# Patient Record
Sex: FEMALE | Race: OTHER | NOT HISPANIC OR LATINO | URBAN - METROPOLITAN AREA
[De-identification: names, ages, dates, MRNs, and addresses within clinical notes are randomized per-mention and may not be internally consistent; named-entity substitution may affect disease eponyms.]

---

## 2017-09-27 ENCOUNTER — EMERGENCY (EMERGENCY)
Facility: HOSPITAL | Age: 47
LOS: 1 days | Discharge: HOME | End: 2017-09-27
Attending: EMERGENCY MEDICINE
Payer: COMMERCIAL

## 2017-09-27 VITALS
HEIGHT: 66 IN | DIASTOLIC BLOOD PRESSURE: 100 MMHG | RESPIRATION RATE: 18 BRPM | OXYGEN SATURATION: 99 % | WEIGHT: 164.91 LBS | SYSTOLIC BLOOD PRESSURE: 153 MMHG | HEART RATE: 68 BPM | TEMPERATURE: 98 F

## 2017-09-27 LAB
ALBUMIN SERPL ELPH-MCNC: 3.7 G/DL — SIGNIFICANT CHANGE UP (ref 3.5–5)
ALP SERPL-CCNC: 46 U/L — SIGNIFICANT CHANGE UP (ref 40–120)
ALT FLD-CCNC: 41 U/L DA — SIGNIFICANT CHANGE UP (ref 10–60)
ANION GAP SERPL CALC-SCNC: 8 MMOL/L — SIGNIFICANT CHANGE UP (ref 5–17)
AST SERPL-CCNC: 21 U/L — SIGNIFICANT CHANGE UP (ref 10–40)
BASOPHILS # BLD AUTO: 0.1 K/UL — SIGNIFICANT CHANGE UP (ref 0–0.2)
BASOPHILS NFR BLD AUTO: 1.6 % — SIGNIFICANT CHANGE UP (ref 0–2)
BILIRUB SERPL-MCNC: 0.5 MG/DL — SIGNIFICANT CHANGE UP (ref 0.2–1.2)
BUN SERPL-MCNC: 16 MG/DL — SIGNIFICANT CHANGE UP (ref 7–18)
CALCIUM SERPL-MCNC: 8.7 MG/DL — SIGNIFICANT CHANGE UP (ref 8.4–10.5)
CHLORIDE SERPL-SCNC: 107 MMOL/L — SIGNIFICANT CHANGE UP (ref 96–108)
CO2 SERPL-SCNC: 27 MMOL/L — SIGNIFICANT CHANGE UP (ref 22–31)
CREAT SERPL-MCNC: 0.85 MG/DL — SIGNIFICANT CHANGE UP (ref 0.5–1.3)
EOSINOPHIL # BLD AUTO: 0.2 K/UL — SIGNIFICANT CHANGE UP (ref 0–0.5)
EOSINOPHIL NFR BLD AUTO: 2.6 % — SIGNIFICANT CHANGE UP (ref 0–6)
GLUCOSE SERPL-MCNC: 83 MG/DL — SIGNIFICANT CHANGE UP (ref 70–99)
HCG SERPL-ACNC: <1 MIU/ML — SIGNIFICANT CHANGE UP
HCT VFR BLD CALC: 39.3 % — SIGNIFICANT CHANGE UP (ref 34.5–45)
HGB BLD-MCNC: 13.7 G/DL — SIGNIFICANT CHANGE UP (ref 11.5–15.5)
LIDOCAIN IGE QN: 190 U/L — SIGNIFICANT CHANGE UP (ref 73–393)
LYMPHOCYTES # BLD AUTO: 2.6 K/UL — SIGNIFICANT CHANGE UP (ref 1–3.3)
LYMPHOCYTES # BLD AUTO: 36.1 % — SIGNIFICANT CHANGE UP (ref 13–44)
MCHC RBC-ENTMCNC: 28.9 PG — SIGNIFICANT CHANGE UP (ref 27–34)
MCHC RBC-ENTMCNC: 34.8 GM/DL — SIGNIFICANT CHANGE UP (ref 32–36)
MCV RBC AUTO: 83.1 FL — SIGNIFICANT CHANGE UP (ref 80–100)
MONOCYTES # BLD AUTO: 0.5 K/UL — SIGNIFICANT CHANGE UP (ref 0–0.9)
MONOCYTES NFR BLD AUTO: 6.5 % — SIGNIFICANT CHANGE UP (ref 2–14)
NEUTROPHILS # BLD AUTO: 3.8 K/UL — SIGNIFICANT CHANGE UP (ref 1.8–7.4)
NEUTROPHILS NFR BLD AUTO: 53.1 % — SIGNIFICANT CHANGE UP (ref 43–77)
PLATELET # BLD AUTO: 227 K/UL — SIGNIFICANT CHANGE UP (ref 150–400)
POTASSIUM SERPL-MCNC: 3.5 MMOL/L — SIGNIFICANT CHANGE UP (ref 3.5–5.3)
POTASSIUM SERPL-SCNC: 3.5 MMOL/L — SIGNIFICANT CHANGE UP (ref 3.5–5.3)
PROT SERPL-MCNC: 7 G/DL — SIGNIFICANT CHANGE UP (ref 6–8.3)
RBC # BLD: 4.73 M/UL — SIGNIFICANT CHANGE UP (ref 3.8–5.2)
RBC # FLD: 11 % — SIGNIFICANT CHANGE UP (ref 10.3–14.5)
SODIUM SERPL-SCNC: 142 MMOL/L — SIGNIFICANT CHANGE UP (ref 135–145)
WBC # BLD: 7.1 K/UL — SIGNIFICANT CHANGE UP (ref 3.8–10.5)
WBC # FLD AUTO: 7.1 K/UL — SIGNIFICANT CHANGE UP (ref 3.8–10.5)

## 2017-09-27 PROCEDURE — 84702 CHORIONIC GONADOTROPIN TEST: CPT

## 2017-09-27 PROCEDURE — 76856 US EXAM PELVIC COMPLETE: CPT

## 2017-09-27 PROCEDURE — 80053 COMPREHEN METABOLIC PANEL: CPT

## 2017-09-27 PROCEDURE — 76830 TRANSVAGINAL US NON-OB: CPT

## 2017-09-27 PROCEDURE — 74177 CT ABD & PELVIS W/CONTRAST: CPT

## 2017-09-27 PROCEDURE — 99284 EMERGENCY DEPT VISIT MOD MDM: CPT | Mod: 25

## 2017-09-27 PROCEDURE — 99285 EMERGENCY DEPT VISIT HI MDM: CPT

## 2017-09-27 PROCEDURE — 83690 ASSAY OF LIPASE: CPT

## 2017-09-27 PROCEDURE — 76830 TRANSVAGINAL US NON-OB: CPT | Mod: 26

## 2017-09-27 PROCEDURE — 74177 CT ABD & PELVIS W/CONTRAST: CPT | Mod: 26

## 2017-09-27 PROCEDURE — 85027 COMPLETE CBC AUTOMATED: CPT

## 2017-09-27 PROCEDURE — 76856 US EXAM PELVIC COMPLETE: CPT | Mod: 26

## 2017-09-27 RX ORDER — SODIUM CHLORIDE 9 MG/ML
1000 INJECTION INTRAMUSCULAR; INTRAVENOUS; SUBCUTANEOUS ONCE
Qty: 0 | Refills: 0 | Status: COMPLETED | OUTPATIENT
Start: 2017-09-27 | End: 2017-09-27

## 2017-09-27 RX ORDER — KETOROLAC TROMETHAMINE 30 MG/ML
15 SYRINGE (ML) INJECTION ONCE
Qty: 0 | Refills: 0 | Status: DISCONTINUED | OUTPATIENT
Start: 2017-09-27 | End: 2017-09-27

## 2017-09-27 RX ADMIN — SODIUM CHLORIDE 1000 MILLILITER(S): 9 INJECTION INTRAMUSCULAR; INTRAVENOUS; SUBCUTANEOUS at 13:43

## 2017-09-27 NOTE — ED PROVIDER NOTE - OBJECTIVE STATEMENT
46 y/o F w/ PMHx of cholecystectomy (july 2016) presents to the ED c/o intermittent RLQ abd pain since cholecystectomy in July 2016. Pt also notes nausea. Pt states she has been having nml BM and notes last BM yesterday. Pt states she went to PMD and had sonogram done which was on the 21st which showed no abnormalities on abd sonogram. Pt denies fever, chills, vomiting, dysuria, hematuria, or any other complaints. Pt is allergic to Penicillin.

## 2017-09-27 NOTE — ED PROVIDER NOTE - MEDICAL DECISION MAKING DETAILS
48 y/o F w/ intermittent abd pain mainly in RLQ. Will r/o appendicitis vs Colitis vs Ovarian cyst/torsion, will obtain labs, urine, pelvic sonogram, CT a/p w/ PO & iv contrast, give fluids. Will give Toradol for pain & check pregnancy.

## 2017-09-27 NOTE — ED PROVIDER NOTE - PROGRESS NOTE DETAILS
Ct scan showed nml appendicitis, some diverticulosis, has fibroids, and 2 small follicular cysts in ovaries. trotter: Ct scan showed nml appendix, some diverticulosis, has fibroids, and 2 small follicular cysts in ovaries.  Pt labs unremarkable.  Ua clean.  pt pain controlled with toradol.    Dx Right ovarian cyst.  f/u with GYN.  motrin and tylenol po prn.  left ambulatory.  return if sx worsens.

## 2017-10-01 DIAGNOSIS — Z88.0 ALLERGY STATUS TO PENICILLIN: ICD-10-CM

## 2017-10-01 DIAGNOSIS — Z90.49 ACQUIRED ABSENCE OF OTHER SPECIFIED PARTS OF DIGESTIVE TRACT: ICD-10-CM

## 2017-10-01 DIAGNOSIS — D25.9 LEIOMYOMA OF UTERUS, UNSPECIFIED: ICD-10-CM

## 2017-10-01 DIAGNOSIS — K57.90 DIVERTICULOSIS OF INTESTINE, PART UNSPECIFIED, WITHOUT PERFORATION OR ABSCESS WITHOUT BLEEDING: ICD-10-CM

## 2017-10-01 DIAGNOSIS — N83.01 FOLLICULAR CYST OF RIGHT OVARY: ICD-10-CM

## 2018-02-02 PROBLEM — Z00.00 ENCOUNTER FOR PREVENTIVE HEALTH EXAMINATION: Status: ACTIVE | Noted: 2018-02-02

## 2018-02-16 ENCOUNTER — ASOB RESULT (OUTPATIENT)
Age: 48
End: 2018-02-16

## 2018-02-16 ENCOUNTER — APPOINTMENT (OUTPATIENT)
Dept: OBGYN | Facility: CLINIC | Age: 48
End: 2018-02-16
Payer: COMMERCIAL

## 2018-02-16 PROCEDURE — 58340 CATHETER FOR HYSTEROGRAPHY: CPT

## 2018-02-16 PROCEDURE — 76831 ECHO EXAM UTERUS: CPT

## 2018-04-12 ENCOUNTER — OUTPATIENT (OUTPATIENT)
Dept: OUTPATIENT SERVICES | Facility: HOSPITAL | Age: 48
LOS: 1 days | End: 2018-04-12
Payer: COMMERCIAL

## 2018-04-12 VITALS
WEIGHT: 166.01 LBS | HEART RATE: 67 BPM | RESPIRATION RATE: 14 BRPM | HEIGHT: 66.5 IN | TEMPERATURE: 97 F | SYSTOLIC BLOOD PRESSURE: 140 MMHG | DIASTOLIC BLOOD PRESSURE: 90 MMHG

## 2018-04-12 DIAGNOSIS — N95.0 POSTMENOPAUSAL BLEEDING: ICD-10-CM

## 2018-04-12 DIAGNOSIS — Z90.49 ACQUIRED ABSENCE OF OTHER SPECIFIED PARTS OF DIGESTIVE TRACT: Chronic | ICD-10-CM

## 2018-04-12 DIAGNOSIS — Z98.891 HISTORY OF UTERINE SCAR FROM PREVIOUS SURGERY: Chronic | ICD-10-CM

## 2018-04-12 DIAGNOSIS — I10 ESSENTIAL (PRIMARY) HYPERTENSION: ICD-10-CM

## 2018-04-12 LAB
BUN SERPL-MCNC: 13 MG/DL — SIGNIFICANT CHANGE UP (ref 7–23)
CALCIUM SERPL-MCNC: 9 MG/DL — SIGNIFICANT CHANGE UP (ref 8.4–10.5)
CHLORIDE SERPL-SCNC: 103 MMOL/L — SIGNIFICANT CHANGE UP (ref 98–107)
CO2 SERPL-SCNC: 26 MMOL/L — SIGNIFICANT CHANGE UP (ref 22–31)
CREAT SERPL-MCNC: 0.9 MG/DL — SIGNIFICANT CHANGE UP (ref 0.5–1.3)
GLUCOSE SERPL-MCNC: 91 MG/DL — SIGNIFICANT CHANGE UP (ref 70–99)
HCT VFR BLD CALC: 36.7 % — SIGNIFICANT CHANGE UP (ref 34.5–45)
HGB BLD-MCNC: 12.1 G/DL — SIGNIFICANT CHANGE UP (ref 11.5–15.5)
MCHC RBC-ENTMCNC: 27.2 PG — SIGNIFICANT CHANGE UP (ref 27–34)
MCHC RBC-ENTMCNC: 33 % — SIGNIFICANT CHANGE UP (ref 32–36)
MCV RBC AUTO: 82.5 FL — SIGNIFICANT CHANGE UP (ref 80–100)
NRBC # FLD: 0 — SIGNIFICANT CHANGE UP
PLATELET # BLD AUTO: 236 K/UL — SIGNIFICANT CHANGE UP (ref 150–400)
PMV BLD: 11.2 FL — SIGNIFICANT CHANGE UP (ref 7–13)
POTASSIUM SERPL-MCNC: 3.5 MMOL/L — SIGNIFICANT CHANGE UP (ref 3.5–5.3)
POTASSIUM SERPL-SCNC: 3.5 MMOL/L — SIGNIFICANT CHANGE UP (ref 3.5–5.3)
RBC # BLD: 4.45 M/UL — SIGNIFICANT CHANGE UP (ref 3.8–5.2)
RBC # FLD: 12.3 % — SIGNIFICANT CHANGE UP (ref 10.3–14.5)
SODIUM SERPL-SCNC: 140 MMOL/L — SIGNIFICANT CHANGE UP (ref 135–145)
WBC # BLD: 6.24 K/UL — SIGNIFICANT CHANGE UP (ref 3.8–10.5)
WBC # FLD AUTO: 6.24 K/UL — SIGNIFICANT CHANGE UP (ref 3.8–10.5)

## 2018-04-12 PROCEDURE — 93010 ELECTROCARDIOGRAM REPORT: CPT

## 2018-04-12 NOTE — H&P PST ADULT - NEGATIVE CARDIOVASCULAR SYMPTOMS
no chest pain/no peripheral edema/no orthopnea/no claudication/no paroxysmal nocturnal dyspnea/no dyspnea on exertion/no palpitations

## 2018-04-12 NOTE — H&P PST ADULT - CARDIOVASCULAR COMMENTS
tx for htn 3 yrs ago for several days, stopped went on a diet took greg supplement, pmd d/c htn meds

## 2018-04-12 NOTE — H&P PST ADULT - NEGATIVE GENERAL SYMPTOMS
no polydipsia/no fatigue/no sweating/no weight loss/no fever/no polyphagia/no chills/no anorexia/no weight gain/no polyuria/no malaise

## 2018-04-12 NOTE — H&P PST ADULT - NEGATIVE BREAST SYMPTOMS
no breast lump R/no nipple discharge L/no breast tenderness R/no nipple discharge R/no breast lump L/no breast tenderness L

## 2018-04-12 NOTE — H&P PST ADULT - RS GEN PE MLT RESP DETAILS PC
no rhonchi/breath sounds equal/good air movement/no wheezes/clear to auscultation bilaterally/no rales/no chest wall tenderness/respirations non-labored/no intercostal retractions

## 2018-04-12 NOTE — H&P PST ADULT - PROBLEM SELECTOR PLAN 1
Pt scheduled for dilation and curettage hysteroscopy on 4/20/2018.  labs done results pending, ekg done  Preop teaching done, pt able to verbalize.

## 2018-04-12 NOTE — H&P PST ADULT - NEGATIVE GENERAL GENITOURINARY SYMPTOMS
no dysuria/no flank pain R/no bladder infections/no hematuria/no flank pain L/no urinary hesitancy/normal urinary frequency

## 2018-04-12 NOTE — H&P PST ADULT - GASTROINTESTINAL DETAILS
no rigidity/no guarding/no distention/soft/nontender/no masses palpable/bowel sounds normal/no bruit/no rebound tenderness

## 2018-04-12 NOTE — H&P PST ADULT - HISTORY OF PRESENT ILLNESS
46y/o female scheduled for dilation and curettage hysteroscopy on 4/20/2018,  Pt states, " since 2016 menstruation has been coming more frequently,  US shows endometrial polyp, fibroids, endometrial thickening."

## 2018-09-21 PROBLEM — N95.0 POSTMENOPAUSAL BLEEDING: Chronic | Status: ACTIVE | Noted: 2018-04-12

## 2018-09-24 ENCOUNTER — OUTPATIENT (OUTPATIENT)
Dept: OUTPATIENT SERVICES | Facility: HOSPITAL | Age: 48
LOS: 1 days | End: 2018-09-24

## 2018-09-24 VITALS
TEMPERATURE: 98 F | SYSTOLIC BLOOD PRESSURE: 126 MMHG | RESPIRATION RATE: 18 BRPM | HEIGHT: 67 IN | WEIGHT: 164.02 LBS | OXYGEN SATURATION: 98 % | DIASTOLIC BLOOD PRESSURE: 84 MMHG | HEART RATE: 63 BPM

## 2018-09-24 DIAGNOSIS — N84.0 POLYP OF CORPUS UTERI: ICD-10-CM

## 2018-09-24 DIAGNOSIS — Z98.890 OTHER SPECIFIED POSTPROCEDURAL STATES: Chronic | ICD-10-CM

## 2018-09-24 DIAGNOSIS — Z90.49 ACQUIRED ABSENCE OF OTHER SPECIFIED PARTS OF DIGESTIVE TRACT: Chronic | ICD-10-CM

## 2018-09-24 DIAGNOSIS — N95.0 POSTMENOPAUSAL BLEEDING: ICD-10-CM

## 2018-09-24 DIAGNOSIS — Z98.891 HISTORY OF UTERINE SCAR FROM PREVIOUS SURGERY: Chronic | ICD-10-CM

## 2018-09-24 LAB
HCG SERPL-ACNC: < 5 MIU/ML — SIGNIFICANT CHANGE UP
HCT VFR BLD CALC: 39.3 % — SIGNIFICANT CHANGE UP (ref 34.5–45)
HGB BLD-MCNC: 13.3 G/DL — SIGNIFICANT CHANGE UP (ref 11.5–15.5)
MCHC RBC-ENTMCNC: 28 PG — SIGNIFICANT CHANGE UP (ref 27–34)
MCHC RBC-ENTMCNC: 33.8 % — SIGNIFICANT CHANGE UP (ref 32–36)
MCV RBC AUTO: 82.7 FL — SIGNIFICANT CHANGE UP (ref 80–100)
NRBC # FLD: 0 — SIGNIFICANT CHANGE UP
PLATELET # BLD AUTO: 210 K/UL — SIGNIFICANT CHANGE UP (ref 150–400)
PMV BLD: 11.3 FL — SIGNIFICANT CHANGE UP (ref 7–13)
RBC # BLD: 4.75 M/UL — SIGNIFICANT CHANGE UP (ref 3.8–5.2)
RBC # FLD: 13.3 % — SIGNIFICANT CHANGE UP (ref 10.3–14.5)
WBC # BLD: 6.29 K/UL — SIGNIFICANT CHANGE UP (ref 3.8–10.5)
WBC # FLD AUTO: 6.29 K/UL — SIGNIFICANT CHANGE UP (ref 3.8–10.5)

## 2018-09-24 RX ORDER — SODIUM CHLORIDE 9 MG/ML
1000 INJECTION, SOLUTION INTRAVENOUS
Qty: 0 | Refills: 0 | Status: DISCONTINUED | OUTPATIENT
Start: 2018-10-05 | End: 2018-10-06

## 2018-09-24 NOTE — H&P PST ADULT - PROBLEM SELECTOR PLAN 1
Pt scheduled for dilation and curettage hysteroscopy on 4/20/2018.  labs done results pending, ekg done  Preop teaching done, pt able to verbalize. Scheduled for dilation and curettage hysteroscopy on 10/05/2018.  Pre-Op instructions provided to patient.  Pepcid for GI prophylaxis provided.   Pt. is Protestant copy of Health Care Proxy in chart and Dr. Haynes's office made aware.   Urine pregancy test DOS-Urine cup provided.

## 2018-09-24 NOTE — H&P PST ADULT - NEGATIVE CARDIOVASCULAR SYMPTOMS
no paroxysmal nocturnal dyspnea/no chest pain/no palpitations/no dyspnea on exertion/no peripheral edema/no orthopnea/no claudication

## 2018-09-24 NOTE — H&P PST ADULT - CARDIOVASCULAR COMMENTS
tx for htn 3 yrs ago for several days, stopped went on a diet took greg supplement & geovanyarconnie, pmd d/c htn meds tx for htn 3 yrs ago for several days, stopped went on a diet took greg supplement & garlic, pmd d/c htn meds

## 2018-09-24 NOTE — H&P PST ADULT - RS GEN PE MLT RESP DETAILS PC
respirations non-labored/clear to auscultation bilaterally/no chest wall tenderness/no intercostal retractions/no rhonchi/good air movement/no wheezes/no rales/breath sounds equal

## 2018-09-24 NOTE — H&P PST ADULT - HISTORY OF PRESENT ILLNESS
49 y/o female scheduled for dilation and curettage hysteroscopy on 10/05/2018,  Pt. reports abnormal vaginal bleeding, s/p  US which shows endometrial polyp & fibroids. 47 y/o female with diagnosis of postmenopausal bleeding scheduled for dilation and curettage hysteroscopy on 10/05/2018,  Pt. reports abnormal vaginal bleeding, s/p  US which shows endometrial polyp & fibroids.

## 2018-09-24 NOTE — H&P PST ADULT - NEGATIVE GENERAL GENITOURINARY SYMPTOMS
no hematuria/no flank pain R/normal urinary frequency/no urinary hesitancy/no bladder infections/no dysuria/no flank pain L

## 2018-09-24 NOTE — H&P PST ADULT - NEGATIVE BREAST SYMPTOMS
no breast tenderness R/no breast tenderness L/no nipple discharge R/no breast lump L/no breast lump R/no nipple discharge L

## 2018-10-04 ENCOUNTER — TRANSCRIPTION ENCOUNTER (OUTPATIENT)
Age: 48
End: 2018-10-04

## 2018-10-05 ENCOUNTER — RESULT REVIEW (OUTPATIENT)
Age: 48
End: 2018-10-05

## 2018-10-05 ENCOUNTER — OUTPATIENT (OUTPATIENT)
Dept: OUTPATIENT SERVICES | Facility: HOSPITAL | Age: 48
LOS: 1 days | Discharge: HOME | End: 2018-10-05
Payer: COMMERCIAL

## 2018-10-05 VITALS
RESPIRATION RATE: 14 BRPM | SYSTOLIC BLOOD PRESSURE: 130 MMHG | HEART RATE: 68 BPM | TEMPERATURE: 98 F | OXYGEN SATURATION: 98 % | DIASTOLIC BLOOD PRESSURE: 93 MMHG

## 2018-10-05 VITALS — WEIGHT: 164.02 LBS | HEIGHT: 67 IN

## 2018-10-05 DIAGNOSIS — Z90.49 ACQUIRED ABSENCE OF OTHER SPECIFIED PARTS OF DIGESTIVE TRACT: Chronic | ICD-10-CM

## 2018-10-05 DIAGNOSIS — Z98.890 OTHER SPECIFIED POSTPROCEDURAL STATES: Chronic | ICD-10-CM

## 2018-10-05 DIAGNOSIS — Z98.891 HISTORY OF UTERINE SCAR FROM PREVIOUS SURGERY: Chronic | ICD-10-CM

## 2018-10-05 DIAGNOSIS — N84.0 POLYP OF CORPUS UTERI: ICD-10-CM

## 2018-10-05 LAB — HCG UR QL: NEGATIVE — SIGNIFICANT CHANGE UP

## 2018-10-05 PROCEDURE — 88305 TISSUE EXAM BY PATHOLOGIST: CPT | Mod: 26

## 2018-10-05 RX ORDER — SODIUM CHLORIDE 9 MG/ML
1000 INJECTION, SOLUTION INTRAVENOUS
Qty: 0 | Refills: 0 | Status: DISCONTINUED | OUTPATIENT
Start: 2018-10-05 | End: 2018-10-06

## 2018-10-05 RX ADMIN — SODIUM CHLORIDE 30 MILLILITER(S): 9 INJECTION, SOLUTION INTRAVENOUS at 07:25

## 2018-10-05 RX ADMIN — SODIUM CHLORIDE 125 MILLILITER(S): 9 INJECTION, SOLUTION INTRAVENOUS at 08:46

## 2018-10-05 NOTE — ASU DISCHARGE PLAN (ADULT/PEDIATRIC). - ITEMS TO FOLLOWUP WITH YOUR PHYSICIAN'S
Please see Dr. Haynes in two weeks. You can take Motrin and Tylenol for pain every 6 hours as needed.

## 2018-10-05 NOTE — ASU DISCHARGE PLAN (ADULT/PEDIATRIC). - MEDICATION SUMMARY - MEDICATIONS TO TAKE
I will START or STAY ON the medications listed below when I get home from the hospital:    garlic pearls  -- 1 tab(s) by mouth once a day/ last day 9/27/18  -- Indication: For Home med    hawthorn  -- 1 tab(s) by mouth once a day in the pm/ last dose 09/27/18  -- Indication: For Home med    chinese medicine tea  -- 10 teas per day for 7 days. 9/8/18-9/15  -- Indication: For Home med

## 2018-10-05 NOTE — ASU DISCHARGE PLAN (ADULT/PEDIATRIC). - ACTIVITY LEVEL
no douching/no intercourse/for two weeks/nothing per vagina nothing per vagina/no douching/for two weeks/no tampons/no intercourse

## 2018-10-05 NOTE — BRIEF OPERATIVE NOTE - PROCEDURE
<<-----Click on this checkbox to enter Procedure Polypectomy, endometrium, hysteroscopic  10/05/2018    Active  PUPPALAPATI  Diagnostic hysteroscopy with dilation and curettage of uterus  10/05/2018    Active  HARESHTI

## 2018-10-05 NOTE — ASU PREOP CHECKLIST - SELECT TESTS ORDERED
CBC/UCG/EKG/HCG
[FreeTextEntry8] : Patient complains of pain in right anterior lower ribs for a week and a half. States her friend gave her a hug and picked her up and she felt something pop. Has had sharp pain since then come up to 10/10 at its worst. She has pain with deep inspiration. No cough. She is taking Tylenol as needed. History is significant for osteoporosis.

## 2018-10-05 NOTE — ASU DISCHARGE PLAN (ADULT/PEDIATRIC). - NOTIFY
Inability to Tolerate Liquids or Foods/Bleeding that does not stop/Persistent Nausea and Vomiting/GYN Fever>100.4 GYN Fever>100.4/Inability to Tolerate Liquids or Foods/Persistent Nausea and Vomiting/Pain not relieved by Medications/Unable to Urinate/Bleeding that does not stop

## 2018-10-05 NOTE — ASU DISCHARGE PLAN (ADULT/PEDIATRIC). - NURSING INSTRUCTIONS
You were given Toradol for pain management. Please DO Not take Motrin/Ibuprofen (NSAIDS) for the next 6 hours (Until 1:45 pm).   You were given 1000mg IV Tylenol for pain management.  Please DO NOT take Tylenol, Vicodin or Percocet for the next 6 hours (until 1:45 pm ).  DO NOT EXCEED 3000MG OF TYLENOL OVER 24 HOURS.

## 2018-10-05 NOTE — BRIEF OPERATIVE NOTE - OPERATION/FINDINGS
Anteverted 9.5cm uterus. Synechiae throughout the uterus, polypoid material was removed with polyp forceps for pathology. Ostia wnl when revisualized after curettage. Anteverted 9.5cm uterus. Synechiae throughout the uterus, polypoid material was removed with polyp forceps for pathology. Ostia wnl when revisualized after curettage.    Dictation: [id number]

## 2018-10-10 LAB — SURGICAL PATHOLOGY STUDY: SIGNIFICANT CHANGE UP

## 2018-10-22 ENCOUNTER — RESULT REVIEW (OUTPATIENT)
Age: 48
End: 2018-10-22

## 2020-02-05 ENCOUNTER — RESULT REVIEW (OUTPATIENT)
Age: 50
End: 2020-02-05

## 2020-06-28 DIAGNOSIS — Z01.818 ENCOUNTER FOR OTHER PREPROCEDURAL EXAMINATION: ICD-10-CM

## 2020-06-29 ENCOUNTER — APPOINTMENT (OUTPATIENT)
Dept: DISASTER EMERGENCY | Facility: CLINIC | Age: 50
End: 2020-06-29

## 2020-06-30 LAB — SARS-COV-2 N GENE NPH QL NAA+PROBE: NOT DETECTED

## 2020-07-01 ENCOUNTER — TRANSCRIPTION ENCOUNTER (OUTPATIENT)
Age: 50
End: 2020-07-01

## 2020-07-02 ENCOUNTER — INPATIENT (INPATIENT)
Facility: HOSPITAL | Age: 50
LOS: 0 days | Discharge: HOME | DRG: 149 | End: 2020-07-03
Attending: SURGERY | Admitting: SURGERY
Payer: COMMERCIAL

## 2020-07-02 ENCOUNTER — RESULT REVIEW (OUTPATIENT)
Age: 50
End: 2020-07-02

## 2020-07-02 ENCOUNTER — OUTPATIENT (OUTPATIENT)
Dept: OUTPATIENT SERVICES | Facility: HOSPITAL | Age: 50
LOS: 1 days | Discharge: HOME | End: 2020-07-02
Payer: COMMERCIAL

## 2020-07-02 VITALS
TEMPERATURE: 98 F | RESPIRATION RATE: 17 BRPM | HEART RATE: 66 BPM | OXYGEN SATURATION: 98 % | SYSTOLIC BLOOD PRESSURE: 144 MMHG | DIASTOLIC BLOOD PRESSURE: 76 MMHG

## 2020-07-02 DIAGNOSIS — Z98.891 HISTORY OF UTERINE SCAR FROM PREVIOUS SURGERY: Chronic | ICD-10-CM

## 2020-07-02 DIAGNOSIS — Z90.49 ACQUIRED ABSENCE OF OTHER SPECIFIED PARTS OF DIGESTIVE TRACT: Chronic | ICD-10-CM

## 2020-07-02 DIAGNOSIS — Z98.890 OTHER SPECIFIED POSTPROCEDURAL STATES: Chronic | ICD-10-CM

## 2020-07-02 PROCEDURE — 88302 TISSUE EXAM BY PATHOLOGIST: CPT | Mod: 26

## 2020-07-02 RX ORDER — ONDANSETRON 8 MG/1
4 TABLET, FILM COATED ORAL EVERY 6 HOURS
Refills: 0 | Status: DISCONTINUED | OUTPATIENT
Start: 2020-07-02 | End: 2020-07-03

## 2020-07-02 RX ORDER — HEPARIN SODIUM 5000 [USP'U]/ML
5000 INJECTION INTRAVENOUS; SUBCUTANEOUS EVERY 8 HOURS
Refills: 0 | Status: DISCONTINUED | OUTPATIENT
Start: 2020-07-02 | End: 2020-07-03

## 2020-07-02 RX ORDER — SODIUM CHLORIDE 9 MG/ML
1000 INJECTION, SOLUTION INTRAVENOUS
Refills: 0 | Status: DISCONTINUED | OUTPATIENT
Start: 2020-07-02 | End: 2020-07-03

## 2020-07-02 RX ORDER — TRAMADOL HYDROCHLORIDE 50 MG/1
1 TABLET ORAL
Qty: 12 | Refills: 0
Start: 2020-07-02 | End: 2020-07-05

## 2020-07-02 RX ORDER — ACETAMINOPHEN 500 MG
650 TABLET ORAL EVERY 6 HOURS
Refills: 0 | Status: DISCONTINUED | OUTPATIENT
Start: 2020-07-02 | End: 2020-07-03

## 2020-07-02 NOTE — H&P ADULT - NSHPSOCIALHISTORY_GEN_ALL_CORE
Patient is . 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 She is very close with her sister who usually joins her for all of her surgeries.       Non-smoker.   Occasional ETOH use, <1/mo  No illicit drug use.

## 2020-07-02 NOTE — H&P ADULT - HISTORY OF PRESENT ILLNESS
50yo F with PSH of cholecystectomy () and  () who presents from [facility] for persistent dizziness and nausea s/p ventral hernia repair with mesh this morning. Patient states that following her hernia repair this morning she was given a medication and began feeling dizzy, nauseous, and experienced some mild SOB. She has experienced symptoms like this in the past following narcotic administration after her cholecystectomy. Upon her arrival at [facility] the patient states the dizziness persists but is only present when she moves. She does not experience the dizziness while laying stationary in bed. She notes some mild nausea and one episode of spit up <1tbsp since arrival but denies emesis. Endorses a mild burning sensation in the chest/throat since the spit up. Endorses 2/10 non-radiating, aching abdominal pain at the site of the incision. Denies chills, SOB, leg pain.

## 2020-07-02 NOTE — PROGRESS NOTE ADULT - SUBJECTIVE AND OBJECTIVE BOX
48yo F with a PSH of cholecystectomy and  presenting to [facility] for [facility] s/p ventral hernia repair w/ mesh complicated by persistent dizziness and nausea 2/2 narcotic administration. Patient underwent uncomplicated ventral hernia repair w/ mesh this morning.

## 2020-07-02 NOTE — H&P ADULT - NSHPREVIEWOFSYSTEMS_GEN_ALL_CORE
CONSTITUTIONAL: No weakness chills  HEENT: Dizzy when moving, Mild throat burning pain  RESPIRATORY: No cough, No shortness of breath  CARDIOVASCULAR: No chest pain or palpitations  GASTROINTESTINAL: See HPI     All other review of systems is negative unless indicated above.

## 2020-07-02 NOTE — BRIEF OPERATIVE NOTE - OPERATION/FINDINGS
Patient prepped and draped per usual fashion. Linear infra-umbilical incision revealing 4cm hernia defect. Hernia sac excised. Hemostasis achieved. Ventralex mesh placed and secure with 0-Novafil suture. Fascia closed with figure of 8 Novafil suture. subq closed with 2-0vicryl. Skin closed with monocryl

## 2020-07-02 NOTE — H&P ADULT - ASSESSMENT
50yo F with PSH of cholecystectomy () and  () who presents from [facility] for persistent dizziness and nausea s/p ventral hernia repair with mesh this morning. Symptoms are most likely due to narcotic rxn per patient history of similar symptoms in the past.     Plan:  Admit to general surgery regional bed for assessment and monitoring   Orthostatic vital signs  Vital signs q8   Diet: regular  IVF: LR @ 50  Pain: Pain control with Tylenol PO. Do not administer narcotic medication.  Activity: OOB, ambulate  PPX: SCDs, Heparin 500 SQ q8, IS 48yo F with PSH of cholecystectomy () and  () who presents from [facility] for persistent dizziness and nausea s/p ventral hernia repair with mesh this morning. Symptoms are most likely due to narcotic rxn per patient history of similar symptoms in the past.     Plan:  Admit to general surgery regional bed for assessment and monitoring   Orthostatic vital signs  Vital signs q8   Diet: regular  IVF: LR @ 50  Pain: Pain control with Tylenol PO. Do not administer narcotic medication.  Activity: OOB, ambulate  PPX: SCDs, Heparin 5000 SQ q8, IS

## 2020-07-02 NOTE — H&P ADULT - NSHPPHYSICALEXAM_GEN_ALL_CORE
Gen: NAD, resting comfortably in bed with face mask  Pulm: Nonlabored breathing, no respiratory distress, speaks in full sentences   Abd: soft, ND, minimally tender to palpation near the incision site. Incision: covered with clean, dry guaze and tape  Extrem: WWP, no calf edema, SCDs in place

## 2020-07-03 ENCOUNTER — TRANSCRIPTION ENCOUNTER (OUTPATIENT)
Age: 50
End: 2020-07-03

## 2020-07-03 VITALS
OXYGEN SATURATION: 98 % | SYSTOLIC BLOOD PRESSURE: 115 MMHG | DIASTOLIC BLOOD PRESSURE: 74 MMHG | TEMPERATURE: 98 F | RESPIRATION RATE: 17 BRPM | HEART RATE: 68 BPM

## 2020-07-03 LAB
ANION GAP SERPL CALC-SCNC: 13 MMOL/L — SIGNIFICANT CHANGE UP (ref 5–17)
BUN SERPL-MCNC: 10 MG/DL — SIGNIFICANT CHANGE UP (ref 7–23)
CALCIUM SERPL-MCNC: 8.6 MG/DL — SIGNIFICANT CHANGE UP (ref 8.4–10.5)
CHLORIDE SERPL-SCNC: 104 MMOL/L — SIGNIFICANT CHANGE UP (ref 96–108)
CO2 SERPL-SCNC: 21 MMOL/L — LOW (ref 22–31)
CREAT SERPL-MCNC: 0.79 MG/DL — SIGNIFICANT CHANGE UP (ref 0.5–1.3)
GLUCOSE SERPL-MCNC: 166 MG/DL — HIGH (ref 70–99)
HCT VFR BLD CALC: 35.8 % — SIGNIFICANT CHANGE UP (ref 34.5–45)
HGB BLD-MCNC: 11.6 G/DL — SIGNIFICANT CHANGE UP (ref 11.5–15.5)
MAGNESIUM SERPL-MCNC: 1.9 MG/DL — SIGNIFICANT CHANGE UP (ref 1.6–2.6)
MCHC RBC-ENTMCNC: 25.3 PG — LOW (ref 27–34)
MCHC RBC-ENTMCNC: 32.4 GM/DL — SIGNIFICANT CHANGE UP (ref 32–36)
MCV RBC AUTO: 78.2 FL — LOW (ref 80–100)
NRBC # BLD: 0 /100 WBCS — SIGNIFICANT CHANGE UP (ref 0–0)
PHOSPHATE SERPL-MCNC: 3.4 MG/DL — SIGNIFICANT CHANGE UP (ref 2.5–4.5)
PLATELET # BLD AUTO: 246 K/UL — SIGNIFICANT CHANGE UP (ref 150–400)
POTASSIUM SERPL-MCNC: 4.1 MMOL/L — SIGNIFICANT CHANGE UP (ref 3.5–5.3)
POTASSIUM SERPL-SCNC: 4.1 MMOL/L — SIGNIFICANT CHANGE UP (ref 3.5–5.3)
RBC # BLD: 4.58 M/UL — SIGNIFICANT CHANGE UP (ref 3.8–5.2)
RBC # FLD: 12.9 % — SIGNIFICANT CHANGE UP (ref 10.3–14.5)
SODIUM SERPL-SCNC: 138 MMOL/L — SIGNIFICANT CHANGE UP (ref 135–145)
WBC # BLD: 11.33 K/UL — HIGH (ref 3.8–10.5)
WBC # FLD AUTO: 11.33 K/UL — HIGH (ref 3.8–10.5)

## 2020-07-03 PROCEDURE — 85027 COMPLETE CBC AUTOMATED: CPT

## 2020-07-03 PROCEDURE — 84100 ASSAY OF PHOSPHORUS: CPT

## 2020-07-03 PROCEDURE — 83735 ASSAY OF MAGNESIUM: CPT

## 2020-07-03 PROCEDURE — 36415 COLL VENOUS BLD VENIPUNCTURE: CPT

## 2020-07-03 PROCEDURE — 80048 BASIC METABOLIC PNL TOTAL CA: CPT

## 2020-07-03 RX ORDER — ONDANSETRON 8 MG/1
1 TABLET, FILM COATED ORAL
Qty: 12 | Refills: 0
Start: 2020-07-03 | End: 2020-07-05

## 2020-07-03 RX ADMIN — ONDANSETRON 4 MILLIGRAM(S): 8 TABLET, FILM COATED ORAL at 00:13

## 2020-07-03 RX ADMIN — Medication 650 MILLIGRAM(S): at 10:38

## 2020-07-03 RX ADMIN — ONDANSETRON 4 MILLIGRAM(S): 8 TABLET, FILM COATED ORAL at 12:51

## 2020-07-03 RX ADMIN — ONDANSETRON 4 MILLIGRAM(S): 8 TABLET, FILM COATED ORAL at 05:53

## 2020-07-03 RX ADMIN — HEPARIN SODIUM 5000 UNIT(S): 5000 INJECTION INTRAVENOUS; SUBCUTANEOUS at 05:53

## 2020-07-03 NOTE — DISCHARGE NOTE PROVIDER - NSDCACTIVITY_GEN_ALL_CORE
No heavy lifting/straining/Stairs allowed/Walking - Indoors allowed/Do not drive or operate machinery/Showering allowed/Walking - Outdoors allowed

## 2020-07-03 NOTE — DISCHARGE NOTE NURSING/CASE MANAGEMENT/SOCIAL WORK - PATIENT PORTAL LINK FT
You can access the FollowMyHealth Patient Portal offered by [facility] by registering at the following website: [email or web address]. By joining [facility]’s FollowMyHealth portal, you will also be able to view your health information using other applications (apps) compatible with our system.

## 2020-07-03 NOTE — PROGRESS NOTE ADULT - SUBJECTIVE AND OBJECTIVE BOX
STATUS POST:  POD 1 from ventral hernia repair with mesh at [facility]     SUBJECTIVE: Patient seen and examined bedside by chief resident. Pt reports mild incisional pain. Emesis x1 ON after PO intake. Improved but continued dizziness with positional changes.    heparin   Injectable 5000 Unit(s) SubCutaneous every 8 hours      Vital Signs Last 24 Hrs  T(C): 36.8 (03 Jul 2020 05:30), Max: 36.8 (03 Jul 2020 05:30)  T(F): 98.3 (03 Jul 2020 05:30), Max: 98.3 (03 Jul 2020 05:30)  HR: 73 (03 Jul 2020 05:30) (66 - 73)  BP: 108/64 (03 Jul 2020 05:30) (108/64 - 144/76)  BP(mean): --  RR: 17 (03 Jul 2020 05:30) (17 - 17)  SpO2: 95% (03 Jul 2020 05:30) (95% - 98%)  I&O's Detail    02 Jul 2020 07:01  -  03 Jul 2020 06:54  --------------------------------------------------------  IN:    lactated ringers.: 450 mL  Total IN: 450 mL    OUT:    Voided: 650 mL  Total OUT: 650 mL    Total NET: -200 mL          General: no acute distress, resting comfortably in bed  Pulm: Nonlabored breathing, no respiratory distress, on RA  Abd: soft, kathy-incisional tenderness, mild distension, incision clean, dry and intact        LABS: pending                RADIOLOGY & ADDITIONAL STUDIES: none

## 2020-07-03 NOTE — DISCHARGE NOTE PROVIDER - NSDCMRMEDTOKEN_GEN_ALL_CORE_FT
chinese medicine tea: 10 teas per day for 7 days. 9/8/18-9/15  garlic pearls: 1 tab(s) orally once a day/ last day 9/27/18  hawthorn: 1 tab(s) orally once a day in the pm/ last dose 09/27/18  traMADol 50 mg oral tablet: 1 tab(s) orally every 8 hours, As Needed -for severe pain MDD:3

## 2020-07-03 NOTE — PROGRESS NOTE ADULT - ASSESSMENT
48yo F with PSH of cholecystectomy () and  () who presented from [facility] with persistent dizziness and nausea s/p ventral hernia repair with mesh yesterday morning. Symptoms are most likely due to narcotic rxn per patient history of similar symptoms in the past.     Plan:  - f/u AM labs  - Continue to monitor orthostatic vital signs  - Vital signs q8   - Diet: regular  - IVF: LR @ 50  - Pain control w/ Tylenol PO. Do not administer narcotic medication.  - Activity: OOB, ambulate w/ assistance  - PPX: SCDs, Heparin 5000 SQ q8, IS  - Disposition: home pending resolution of dizziness

## 2020-07-03 NOTE — DISCHARGE NOTE PROVIDER - HOSPITAL COURSE
Pt is a 50yo F with PSH of cholecystectomy () and  () who presents from [facility] for persistent dizziness and nausea s/p ventral hernia repair with mesh this morning. Patient states that following her hernia repair this morning she was given a medication and began feeling dizzy, nauseous, and experienced some mild SOB. She has experienced symptoms like this in the past following narcotic administration after her cholecystectomy. This dizziness was likely secondary to opioid pain medication administration. She had a similar episode in the past after a cholecystectomy. Diet was advanced as tolerated and pain was well controlled on medication. Orthostatic vital signs were normal. On day of discharge, pt deemed stable and ready to return home with plan to follow up as an outpatient.

## 2020-07-03 NOTE — DISCHARGE NOTE PROVIDER - CARE PROVIDER_API CALL
Yung Hernandez A  SURGERY  [address] [address]  Phone: [phone]  Fax: [phone]  Follow Up Time:

## 2020-07-03 NOTE — DISCHARGE NOTE PROVIDER - NSDCFUADDINST_GEN_ALL_CORE_FT
General Discharge Instructions:  Please resume all regular home medications unless specifically advised not to take a particular medication. Take Tylenol or Motrin for mild or moderate pain. You have been prescribed Tramadol (Ultram) 50mg tablet for severe pain every 8 hours, as needed.  Please get plenty of rest, continue to ambulate several times per day, and drink adequate amounts of fluids. Avoid lifting weights greater than 5-10 lbs until you follow-up with your surgeon, who will instruct you further regarding activity restrictions.  Avoid driving or operating heavy machinery while taking pain medications.  Please follow-up with your surgeon (Dr. Hernandez) and Primary Care Provider (PCP) as advised.    Incision Care:  *Please call your doctor or nurse practitioner if you have increased pain, swelling, redness, or drainage from the incision site.  *Avoid swimming and baths until your follow-up appointment.  *You may shower, and wash surgical incisions with a mild soap and warm water. Gently pat the area dry.  *If you have staples, they will be removed at your follow-up appointment.  *If you have steri-strips, they will fall off on their own. Please remove any remaining strips 7-10 days after surgery.    Warning Signs:  Please call your doctor or nurse practitioner if you experience the following:  *You experience new chest pain, pressure, squeezing or tightness.  *New or worsening cough, shortness of breath, or wheeze.  *If you are vomiting and cannot keep down fluids or your medications.  *You are getting dehydrated due to continued vomiting, diarrhea, or other reasons. Signs of dehydration include dry mouth, rapid heartbeat, or feeling dizzy or faint when standing.  *You see blood or dark/black material when you vomit or have a bowel movement.  *You experience burning when you urinate, have blood in your urine, or experience a discharge.  *Your pain is not improving within 8-12 hours or is not gone within 24 hours. Call or return immediately if your pain is getting worse, changes location, or moves to your chest or back.  *You have shaking chills, or fever greater than 101.5 degrees Fahrenheit or 38 degrees Celsius.  *Any change in your symptoms, or any new symptoms that concern you.

## 2020-07-07 DIAGNOSIS — R42 DIZZINESS AND GIDDINESS: ICD-10-CM

## 2020-07-07 DIAGNOSIS — T40.605A ADVERSE EFFECT OF UNSPECIFIED NARCOTICS, INITIAL ENCOUNTER: ICD-10-CM
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
## 2020-07-07 DIAGNOSIS — Z88.0 ALLERGY STATUS TO PENICILLIN: ICD-10-CM

## 2020-07-07 DIAGNOSIS — R11.0 NAUSEA: ICD-10-CM

## 2020-07-07 LAB — SURGICAL PATHOLOGY STUDY: SIGNIFICANT CHANGE UP

## 2020-10-23 NOTE — ASU PATIENT PROFILE, ADULT - TEACHING/LEARNING EDUCATIONAL LEVEL
PAT call complete. Allergies, medical hx, surgical hx reviewed. Instructed patient to Reviewed instructions sent by nathalia via portal/letter. Arrival time of 0715 given via letter/portal/VM to daugther per patients request. Call back # given in case daughter/patient have questions.     
[facility]

## 2020-10-28 ENCOUNTER — EMERGENCY (EMERGENCY)
Facility: HOSPITAL | Age: 50
LOS: 1 days | Discharge: HOME | End: 2020-10-28
Attending: STUDENT IN AN ORGANIZED HEALTH CARE EDUCATION/TRAINING PROGRAM | Admitting: STUDENT IN AN ORGANIZED HEALTH CARE EDUCATION/TRAINING PROGRAM
Payer: COMMERCIAL

## 2020-10-28 VITALS
HEART RATE: 76 BPM | RESPIRATION RATE: 18 BRPM | SYSTOLIC BLOOD PRESSURE: 192 MMHG | DIASTOLIC BLOOD PRESSURE: 124 MMHG | TEMPERATURE: 98 F | OXYGEN SATURATION: 100 %

## 2020-10-28 VITALS
DIASTOLIC BLOOD PRESSURE: 119 MMHG | RESPIRATION RATE: 20 BRPM | HEIGHT: 67 IN | SYSTOLIC BLOOD PRESSURE: 179 MMHG | HEART RATE: 93 BPM | TEMPERATURE: 98 F | OXYGEN SATURATION: 99 %

## 2020-10-28 DIAGNOSIS — Z98.891 HISTORY OF UTERINE SCAR FROM PREVIOUS SURGERY: Chronic | ICD-10-CM

## 2020-10-28 DIAGNOSIS — Z98.890 OTHER SPECIFIED POSTPROCEDURAL STATES: Chronic | ICD-10-CM

## 2020-10-28 DIAGNOSIS — Z90.49 ACQUIRED ABSENCE OF OTHER SPECIFIED PARTS OF DIGESTIVE TRACT: Chronic | ICD-10-CM

## 2020-10-28 LAB
ALBUMIN SERPL ELPH-MCNC: 4.5 G/DL — SIGNIFICANT CHANGE UP (ref 3.3–5)
ALP SERPL-CCNC: 42 U/L — SIGNIFICANT CHANGE UP (ref 40–120)
ALT FLD-CCNC: 49 U/L — HIGH (ref 4–33)
ANION GAP SERPL CALC-SCNC: 10 MMO/L — SIGNIFICANT CHANGE UP (ref 7–14)
AST SERPL-CCNC: 27 U/L — SIGNIFICANT CHANGE UP (ref 4–32)
BASOPHILS # BLD AUTO: 0.03 K/UL — SIGNIFICANT CHANGE UP (ref 0–0.2)
BASOPHILS NFR BLD AUTO: 0.5 % — SIGNIFICANT CHANGE UP (ref 0–2)
BILIRUB SERPL-MCNC: 0.7 MG/DL — SIGNIFICANT CHANGE UP (ref 0.2–1.2)
BUN SERPL-MCNC: 16 MG/DL — SIGNIFICANT CHANGE UP (ref 7–23)
CALCIUM SERPL-MCNC: 9 MG/DL — SIGNIFICANT CHANGE UP (ref 8.4–10.5)
CHLORIDE SERPL-SCNC: 103 MMOL/L — SIGNIFICANT CHANGE UP (ref 98–107)
CO2 SERPL-SCNC: 29 MMOL/L — SIGNIFICANT CHANGE UP (ref 22–31)
CREAT SERPL-MCNC: 0.84 MG/DL — SIGNIFICANT CHANGE UP (ref 0.5–1.3)
EOSINOPHIL # BLD AUTO: 0.14 K/UL — SIGNIFICANT CHANGE UP (ref 0–0.5)
EOSINOPHIL NFR BLD AUTO: 2.4 % — SIGNIFICANT CHANGE UP (ref 0–6)
GLUCOSE SERPL-MCNC: 158 MG/DL — HIGH (ref 70–99)
HCG SERPL-ACNC: < 5 MIU/ML — SIGNIFICANT CHANGE UP
HCT VFR BLD CALC: 41.5 % — SIGNIFICANT CHANGE UP (ref 34.5–45)
HGB BLD-MCNC: 14.5 G/DL — SIGNIFICANT CHANGE UP (ref 11.5–15.5)
IMM GRANULOCYTES NFR BLD AUTO: 0.2 % — SIGNIFICANT CHANGE UP (ref 0–1.5)
LYMPHOCYTES # BLD AUTO: 1.94 K/UL — SIGNIFICANT CHANGE UP (ref 1–3.3)
LYMPHOCYTES # BLD AUTO: 32.7 % — SIGNIFICANT CHANGE UP (ref 13–44)
MAGNESIUM SERPL-MCNC: 2.3 MG/DL — SIGNIFICANT CHANGE UP (ref 1.6–2.6)
MCHC RBC-ENTMCNC: 28.4 PG — SIGNIFICANT CHANGE UP (ref 27–34)
MCHC RBC-ENTMCNC: 34.9 % — SIGNIFICANT CHANGE UP (ref 32–36)
MCV RBC AUTO: 81.2 FL — SIGNIFICANT CHANGE UP (ref 80–100)
MONOCYTES # BLD AUTO: 0.36 K/UL — SIGNIFICANT CHANGE UP (ref 0–0.9)
MONOCYTES NFR BLD AUTO: 6.1 % — SIGNIFICANT CHANGE UP (ref 2–14)
NEUTROPHILS # BLD AUTO: 3.46 K/UL — SIGNIFICANT CHANGE UP (ref 1.8–7.4)
NEUTROPHILS NFR BLD AUTO: 58.1 % — SIGNIFICANT CHANGE UP (ref 43–77)
NRBC # FLD: 0 K/UL — SIGNIFICANT CHANGE UP (ref 0–0)
PHOSPHATE SERPL-MCNC: 2.6 MG/DL — SIGNIFICANT CHANGE UP (ref 2.5–4.5)
PLATELET # BLD AUTO: 206 K/UL — SIGNIFICANT CHANGE UP (ref 150–400)
PMV BLD: 10.3 FL — SIGNIFICANT CHANGE UP (ref 7–13)
POTASSIUM SERPL-MCNC: 3.1 MMOL/L — LOW (ref 3.5–5.3)
POTASSIUM SERPL-SCNC: 3.1 MMOL/L — LOW (ref 3.5–5.3)
PROT SERPL-MCNC: 6.6 G/DL — SIGNIFICANT CHANGE UP (ref 6–8.3)
RBC # BLD: 5.11 M/UL — SIGNIFICANT CHANGE UP (ref 3.8–5.2)
RBC # FLD: 12.3 % — SIGNIFICANT CHANGE UP (ref 10.3–14.5)
SODIUM SERPL-SCNC: 142 MMOL/L — SIGNIFICANT CHANGE UP (ref 135–145)
WBC # BLD: 5.94 K/UL — SIGNIFICANT CHANGE UP (ref 3.8–10.5)
WBC # FLD AUTO: 5.94 K/UL — SIGNIFICANT CHANGE UP (ref 3.8–10.5)

## 2020-10-28 PROCEDURE — 99285 EMERGENCY DEPT VISIT HI MDM: CPT

## 2020-10-28 PROCEDURE — 72125 CT NECK SPINE W/O DYE: CPT | Mod: 26

## 2020-10-28 PROCEDURE — 70450 CT HEAD/BRAIN W/O DYE: CPT | Mod: 26

## 2020-10-28 RX ORDER — ACETAMINOPHEN 500 MG
650 TABLET ORAL ONCE
Refills: 0 | Status: COMPLETED | OUTPATIENT
Start: 2020-10-28 | End: 2020-10-28

## 2020-10-28 RX ORDER — POTASSIUM CHLORIDE 20 MEQ
40 PACKET (EA) ORAL ONCE
Refills: 0 | Status: COMPLETED | OUTPATIENT
Start: 2020-10-28 | End: 2020-10-28

## 2020-10-28 RX ADMIN — Medication 650 MILLIGRAM(S): at 20:00

## 2020-10-28 RX ADMIN — Medication 40 MILLIEQUIVALENT(S): at 20:22

## 2020-10-28 NOTE — ED ADULT NURSE REASSESSMENT NOTE - NS ED NURSE REASSESS COMMENT FT1
ER pt coming with trouble walking at time loosing her balance and some foggy thought, + frontal pressure and HTN, no facial drop, no diff. talking, pt stated was having some HTN issue in the past, takes no meds.   [facility]       labs sent, IV to right AC 20g angio cath.    pt to CT.       Catalina Jimenez RN

## 2020-10-28 NOTE — ED PROVIDER NOTE - ADDITIONAL NOTES AND INSTRUCTIONS:
To whom it may concern.    Please excuse Ms. Hutchison from her work duties for the day of 10/30/20.  Ms. Hutchison can return to work on 10/31/2020.  Sincerely,  Jose Arriaga MD

## 2020-10-28 NOTE — ED PROVIDER NOTE - NSFOLLOWUPINSTRUCTIONS_ED_ALL_ED_FT
You were seen in the emergency room today for concerns of dizziness and frontal headache with high blood pressure. Although our exams were negative it is important that you follow up with your primary care doctor and attend your neurology appointment next week to better treat these issues.    You should return to the ED if you faint again, hit your head, cannot move an extremity, you have chest pain or the headache worsens with similar symptoms as today.

## 2020-10-28 NOTE — ED PROVIDER NOTE - FAMILY HISTORY
Sibling  Still living? Unknown  FHx: breast cancer, Age at diagnosis: Age Unknown     Father  Still living? Unknown  FHx: hypertension, Age at diagnosis: Age Unknown

## 2020-10-28 NOTE — ED PROVIDER NOTE - PHYSICAL EXAMINATION
Gen: WDWN, NAD  HEENT: EOMI, no nasal discharge, mucous membranes moist  CV: RRR, +S1/S2, no M/R/G, no crackles  Resp: CTAB, no W/R/R  GI: Abdomen soft non-distended, NTTP, no masses  MSK: No open wounds, no bruising, no LE edema  Neuro: A&Ox4, following commands, moving all four extremities spontaneously, full neuro exam completely nonfocal  Psych: appropriate mood, denies AH, VH, SI

## 2020-10-28 NOTE — ED PROVIDER NOTE - PATIENT PORTAL LINK FT
You can access the FollowMyHealth Patient Portal offered by [facility] by registering at the following website: [email or web address]. By joining [facility]’s FollowMyHealth portal, you will also be able to view your health information using other applications (apps) compatible with our system.

## 2020-10-28 NOTE — ED PROVIDER NOTE - OBJECTIVE STATEMENT
50F sent from urgent care for pre-syncopal episode associated with frontal HA/pressure and neck pain. She states that while at work today, she tried to get up from her seat, felt dizzy, lost her balance and needed to grab onto something. She states that this happened one more time before she took her blood pressure, found it was elevated and decided to go an urgent care, where they prescribed her HCTZ 12.5 and sent her here. She denies chest pain, abdominal pain, inability to walk, difficulty breathing, nausea, vomiting, or fevers.

## 2020-10-28 NOTE — ED PROVIDER NOTE - NS ED ROS FT
Gen: No fever, normal appetite  Eyes: No eye irritation or discharge  ENT: No ear pain, congestion, sore throat  Resp: No cough or trouble breathing  Cardiovascular: No chest pain or palpitation  Gastroenteric: No nausea/vomiting, diarrhea, constipation  :  No change in urine output; no dysuria  MS: No joint or muscle pain  Skin: No rashes  Neuro: (+) frontal pressure, dizziness when standing; no abnormal movements  Remainder negative, except as per the HPI

## 2020-10-28 NOTE — ED ADULT TRIAGE NOTE - CHIEF COMPLAINT QUOTE
[facility] by [email or web address] for trouble walking, with frontal HA pressure behind el. eye. started at 11AM.    upper body pain, neck area, left arm.      + COVID 19 in March 13/2020   eval. for stroke.   Hx. hernia repaired pt sent by [facility] for trouble walking, with frontal HA pressure behind el. eye. started at 11AM.    upper body pain, neck area, left arm.  upper and lower ext. equal strength.    + COVID 19 in March 13/2020   eval. for stroke. no code called.   Hx. hernia repaired

## 2020-10-28 NOTE — ED PROVIDER NOTE - CLINICAL SUMMARY MEDICAL DECISION MAKING FREE TEXT BOX
50F here today for frontal pressure, dizziness and elevated BPs. Concern for new onset HTN vs issues with cerebral perfusion. Low suspicion for CVA. Will draw labs, CTH, and reassess. Likely d/c.

## 2020-10-28 NOTE — ED PROVIDER NOTE - ATTENDING CONTRIBUTION TO CARE
50F p/w high blood pressure and dizziness. States symptoms began today with lightheadedness, took BP 160s/90s with associated frontal HA/pressure. Went to urgent care who prescribed HCTZ and recommended neuro follow-up, but then sent to ED when she reported the dizziness. Denies chest pain, abdominal pain, inability to walk, difficulty breathing, nausea, vomiting, or fevers. Has neuro appt for next week but came to ED now for acute assessment.  Gen: nad  CV: rrr, no murmur  Pulm: clear lungs  Abd: soft, nt, nd  Neuro: CN2-12 intact, strenght 5/5 all extremities, sensation grossly intact  Ext: no edema  MDM: 50F p/w high blood pressure and pre-syncopal episode likely in setting of elevated BP - Neuro exam benign, low suspicion for CVA/TIA - will get cbc, cmp for renal function and electrolytes, ekg and CT head - if w/u negative can dc with pcp and neuro follow-up

## 2020-12-02 NOTE — H&P PST ADULT - HEIGHT IN FEET
"Laura Sexton is a 40 year old female who is being evaluated via a billable telephone visit.      The patient has been notified of following:     \"This telephone visit will be conducted via a call between you and your physician/provider. We have found that certain health care needs can be provided without the need for a physical exam.  This service lets us provide the care you need with a short phone conversation.  If a prescription is necessary we can send it directly to your pharmacy.  If lab work is needed we can place an order for that and you can then stop by our lab to have the test done at a later time.    Telephone visits are billed at different rates depending on your insurance coverage. During this emergency period, for some insurers they may be billed the same as an in-person visit.  Please reach out to your insurance provider with any questions.    If during the course of the call the physician/provider feels a telephone visit is not appropriate, you will not be charged for this service.\"    Patient has given verbal consent for Telephone visit?  Yes    What phone number would you like to be contacted at? [phone]    How would you like to obtain your AVS? Mail a copy    S:   Pt being seen in f/u for DM.  Previously seen by Dr Cummings:  \"1994. Diagnosed with GDM, age 14  During pregnancy, diet management without insulin  Delivered 9/1994, daughter birthweight 7#11oz     1995. Diagnosed with diabetes mellitus  Began insulin treatment, has taken since  Has also used diabetes pills as metformin, also glimeparide             Recalls having low BG on glimeparide in the past  Subsequent 5 more pregnancies though 2 MC, daughter [id number], son born 6/2005  Insulin meds have included Humalog, Novolog, Lantus     Had previously seen Dr. Franky Engel/[facility]  Treatment with Lantus daily and mealtime Novolog, metformin  Tried Bidureon- had hives, may have tried Trulicity- hives\"    Metformin 1000 mg BID  Lantus " "60 U BID  Novolog 4U/15 grams plus 3U/50 sliding scale insulin  trulicity 0.75 mg once a week.     CGM:  Avg 228 over the last 30 days.     Sometimes misses meal boluses.   She lost her job due to cut back from COVID. Starting new job next week.   Lots of stress and putting her care on the back burner.   When she does \"everything I am supposed to\", she will have lows around 1700.     Again she has become frustrated that her BP medications have not seemed to help.   Explained that often times high doses of multiple medications are needed.     Gabapentin 200 mg BID is helping her neuropathy.     ROS: 10 point ROS neg other than the symptoms noted above in the HPI.    O:  Gen: In NAD.     A/P:   Type 2 DM - Uncontrolled. Discussed SGLT-2 inhibitors. ICR 4, sensitivity 13. She has become acclimated to running high and gets symptoms of hypoglycemia with readings in 150's.   In 2/2020, readings improved with changing to carb based dosing. She has been having sensation of going low while at work and has to snack.   In 12/2020, given HTN discussed jardiance. Also discussed increasing trulicity dose. Running low when she takes her medications as prescribed. Admits that she feels she is not taking medications as prescribed in part to avoid lows.   -Check if your metformin XR is on the recall list or not.  -Lower morning insulin from 60 to 50 Units.   -Schedule labs.   -Find the USB cable for the [employer] and set up a [employer] View account. Once you do, let me know and we can connect to you if you give us your email address to find the account.   -Increase exercise as tolerated.   -ASA not indicated.  -BP: elevated.    -Screen for hyperaldosteronism.   -Lipids: high triglycerides in 11/2019. Continue to work on DM control.   Repeat due.    -Microalbumin 217.67 in 11/2019. Allergy to amlodipine, lisinopril and losartan.    Referred to Nephrology. Dr Fuentes.    They have been managing her HTN.    -Eyes: reports normal exam in 2019. Due " for exam.   -Smoking: none.     Due to the COVID 19 pandemic this visit was a telephone/video visit in order to help prevent spread of infection in this high risk patient and the general population. The patient gave verbal consent for the visit today.    Start time 1400  Stop time 1425  Total time 25  This visit would have been billed as 59356 as an E & M code     Harvey Bryant MD on 12/2/2020 at 2:25 PM                   5

## 2022-07-08 ENCOUNTER — RESULT REVIEW (OUTPATIENT)
Age: 52
End: 2022-07-08

## 2022-07-27 ENCOUNTER — APPOINTMENT (OUTPATIENT)
Dept: SURGICAL ONCOLOGY | Facility: CLINIC | Age: 52
End: 2022-07-27

## 2022-07-27 VITALS
DIASTOLIC BLOOD PRESSURE: 86 MMHG | HEART RATE: 65 BPM | SYSTOLIC BLOOD PRESSURE: 135 MMHG | RESPIRATION RATE: 16 BRPM | OXYGEN SATURATION: 97 % | BODY MASS INDEX: 26.68 KG/M2 | HEIGHT: 67 IN | TEMPERATURE: 97.7 F | WEIGHT: 170 LBS

## 2022-07-27 DIAGNOSIS — C50.911 MALIGNANT NEOPLASM OF UNSPECIFIED SITE OF RIGHT FEMALE BREAST: ICD-10-CM

## 2022-07-27 PROCEDURE — 99245 OFF/OP CONSLTJ NEW/EST HI 55: CPT

## 2022-07-27 NOTE — OB HISTORY
[Postmenopausal] : The patient is postmenopausal [Menarche Age ____] : menarche age [unfilled] [Total Preg ___] : G[unfilled] [Live Births ___] : P[unfilled]

## 2022-07-30 NOTE — ADDENDUM
[FreeTextEntry1] : I, Jennifer Shore, acted solely as a scribe for Dr. Nazario Osuna on this date 07/27/2022.\par

## 2022-07-30 NOTE — HISTORY OF PRESENT ILLNESS
[de-identified] : Patient is a 50 y/o female who presents an initial consultation. She underwent core biopsy of the suspicious 2.9 cm right breast mass 9:00 seen on mammo/sono on 7/8/22 in which pathology revealed invasive ductal carcinoma (ER+/IL+, Her-2 negative by [facility]). \par \par Biopsy pathology (7/8/22):\par 1. Right breast mass 9 o'clock 7 cm from nipple, core biopsy: \par - Invasive moderately differentiated ductal carcinoma with associated calcifications measuring 0.9 cm in greatest length in this material, \par -ER+/IL+, Her-2 negative by [facility] \par \par Patient underwent unilateral right diagnostic mammogram and ultrasound on 7/8/22 which revealed 2.9 cm right breast mass at 9:00 7 cmfn is suspicious and corresponds with the mass seen on mammogram. Ultrasound-guided biopsy is recommended to document intermodality concordance. (BI-RADS 4)\par \par PMHx: HTN, allergies to Penicillin and Oxycodine. Pt is highly sensitive to narcotics. \par PSHx: Hernia repair, Cholecystectomy \par Patient has the following family history of breast cancer in her 2 sisters. One sister detected @age 38 with negative genetic testing. 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

## 2022-07-30 NOTE — CONSULT LETTER
[Consult Letter:] : I had the pleasure of evaluating your patient, [unfilled]. [Please see my note below.] : Please see my note below. [Sincerely,] : Sincerely, [Dear  ___] : Dear  [unfilled], [FreeTextEntry3] : Nazario Osuna MD FACS\par

## 2022-07-30 NOTE — ASSESSMENT
[FreeTextEntry1] : T2 right breast cancer (ER+/AL+, Her-2 negative by [facility])\par I had a long discussion with the pt and her sister regarding her diagnosis, prognosis and all management options. 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

## 2022-07-30 NOTE — PHYSICAL EXAM
[Normal] : supple, no neck mass and thyroid not enlarged [Normal Neck Lymph Nodes] : normal neck lymph nodes  [Normal Supraclavicular Lymph Nodes] : normal supraclavicular lymph nodes [Normal Groin Lymph Nodes] : normal groin lymph nodes [Normal Axillary Lymph Nodes] : normal axillary lymph nodes [Normal] : oriented to person, place and time, with appropriate affect [de-identified] : 2.5 cm mass right breast 9:00 N4. us confirms irregular hypoechoic mass w/ biopsy clip. small right axillary lymph node. us shows normal architecture. no masses or adenopathy bilaterally.

## 2022-08-04 ENCOUNTER — APPOINTMENT (OUTPATIENT)
Dept: NUCLEAR MEDICINE | Facility: HOSPITAL | Age: 52
End: 2022-08-04

## 2022-08-04 NOTE — H&P PST ADULT - MEDICATION ADMINISTRATION INFO, PROFILE
Please let patient know the erosive marker for rheumatoid arthritis is elevated, would recommend pursuing biologic/small molecule therapy as we had already discussed.  Let her know that insurance prefers the treatments as below.  I would be okay with a retrial of Enbrel if she is interested.  I know it was taken away with the diagnosis of lupus but I don't think it was the cause.   no concerns

## 2022-08-22 ENCOUNTER — RESULT REVIEW (OUTPATIENT)
Age: 52
End: 2022-08-22

## 2022-08-30 ENCOUNTER — APPOINTMENT (OUTPATIENT)
Dept: SURGICAL ONCOLOGY | Facility: HOSPITAL | Age: 52
End: 2022-08-30

## 2023-02-24 NOTE — ED PROVIDER NOTE - NS ED ATTENDING STATEMENT MOD
Detail Level: Simple Render Risk Assessment In Note?: no Comment: Excision and C&D were offered, she opted for C&D.  She is aware that it is a higher risk of recurrence. Attending Only

## 2025-04-11 NOTE — ED ADULT NURSE NOTE - NS ED NURSE DISCH DISPOSITION
Passive ROM/bilateral upper extremity ROM was WFL (within functional limits)/bilateral lower extremity ROM was WFL (within functional limits) Discharged

## 2025-05-05 NOTE — ASU PREOP CHECKLIST - IV STARTED
Encounter Date: 5/4/2025       History     Chief Complaint   Patient presents with    Groin Pain     R sided groin pain. Felt like a knot and is tender. No pain noted today. Denies fever. Denies currently being swollen.      61-year-old female presents with right groin pain.  She states occasionally she will feel a bulge or swollen piece just underneath the skin just above the inguinal ligament of the right side.  She states it started about 2 weeks ago and felt like there was some pulling pain in that area that radiated toward the umbilical region.  She did not have any nausea vomiting or other symptoms.  It went away.  She states she has occasionally felt the knot like sensation that has not feeling it currently.  She states she checked in today because she is bring her grandchild to the emergency department and thought that she would get herself evaluated while she was here.  She has an appointment with her PCP on the 25th of this month but was not sure it was safe to wait until that time she has a history of breast cancer states she is in remission.  She had reconstructive surgery of the breasts where they took tissue from her lower abdomen reconstructive breast tissue that has near the area where she was having pain        Review of patient's allergies indicates:  No Known Allergies  Past Medical History:   Diagnosis Date    Cancer 3/21/2022    breast    Diabetes mellitus     Hypercholesterolemia     Hypertension     Other pulmonary embolism without acute cor pulmonale      Past Surgical History:   Procedure Laterality Date    BILATERAL MASTECTOMY Bilateral 06/03/2022    Procedure: MASTECTOMY, BILATERAL;  Surgeon: Charlene Contreras MD;  Location: [facility];  Service: General;  Laterality: Bilateral;    BREAST REVISION SURGERY Bilateral 05/14/2024    Procedure: BREAST REVISION SURGERY (bilateral removal of excess axillary skin and subcutaneous tissue, use of liposuction and direct incision);  Surgeon: Nick Gibbs  MD LUIS ANGEL;  Location: [facility] OR;  Service: Plastics;  Laterality: Bilateral;    BREAST SURGERY  6/3/2022     SECTION  89 and 3/15/95    COLONOSCOPY      INSERTION OF BREAST TISSUE EXPANDER Bilateral 2022    Procedure: INSERTION, TISSUE EXPANDER, BREAST (Bilateral breast reconstruction with tissue expanders and acellular dermal matrix and use of intraoperative fluoroscein angiogragry);  Surgeon: Nick Gibbs MD;  Location: [facility] OR;  Service: Plastics;  Laterality: Bilateral;    LIPOSUCTION Bilateral 2024    Procedure: LIPOSUCTION (bilateral removal of excess axillary skin and subcutaneous tissue, use of liposuction and direct incision);  Surgeon: Nick Gibbs MD;  Location: [facility] OR;  Service: Plastics;  Laterality: Bilateral;    MEDIPORT INSERTION, SINGLE Right     RECONSTRUCTION OF BREAST WITH DEEP INFERIOR EPIGASTRIC ARTERY  (BENSON) FLAP Bilateral 2023    Procedure: RECONSTRUCTION, BREAST, USING BENSON SKIN FLAP (Bilateral breast recon with BENSON flaps, possible TRAM flaps, possible hernia repair with mesh, use of intraoperative fluoroscein angiography.;  Surgeon: Nick Gibbs MD;  Location: [facility] OR;  Service: Plastics;  Laterality: Bilateral;    REMOVAL OF VASCULAR ACCESS PORT Right 2023    Procedure: REMOVAL, VASCULAR ACCESS PORT;  Surgeon: Nick Gibbs MD;  Location: [facility] OR;  Service: Plastics;  Laterality: Right;    SENTINEL LYMPH NODE BIOPSY Bilateral 2022    Procedure: BIOPSY, LYMPH NODE, SENTINEL;  Surgeon: Charlene Contreras MD;  Location: [facility] OR;  Service: General;  Laterality: Bilateral;  MAGTRACE Injection in office  Need SentiMag Probe    TUBAL LIGATION  3/15/1995     Family History   Problem Relation Name Age of Onset    Heart attack Mother Mother     Hypertension Mother Mother     Breast cancer Sister Madison Kaur     Cancer Sister Madisno Kaur     Breast cancer Sister Marti Ham         remission    Cancer Sister Marti Ham     Stroke  Brother Ulysses Lawrence             Diabetes Brother Ulysses Lawrence     Pancreatic cancer Brother      Breast cancer Maternal Aunt Kiseha Shirley             Breast cancer Maternal Aunt Teri Aden             Breast cancer Maternal Aunt Danna Gonzalez             Breast cancer Maternal Aunt Astrid Rincon     Breast cancer Maternal Uncle Jaya Gordon             Breast cancer Maternal Grandmother Phoebe Gordon              Social History[1]  Review of Systems   Constitutional:  Negative for chills and fever.   Respiratory:  Negative for cough and shortness of breath.    Cardiovascular:  Negative for chest pain.   Gastrointestinal:  Positive for abdominal pain. Negative for nausea and vomiting.   Musculoskeletal:  Negative for myalgias.   All other systems reviewed and are negative.      Physical Exam     Initial Vitals [25]   BP Pulse Resp Temp SpO2   (!) 160/96 89 18 98.4 °F (36.9 °C) 99 %      MAP       --         Physical Exam    Nursing note and vitals reviewed.  Constitutional: She appears well-developed and well-nourished. No distress.   HENT:   Head: Normocephalic and atraumatic.   Eyes: Conjunctivae are normal.   Cardiovascular:  Normal rate and intact distal pulses.           Pulmonary/Chest: No respiratory distress. She has no rhonchi.   Abdominal: Abdomen is soft. Bowel sounds are normal. There is no abdominal tenderness.   Healed remote low transverse abdominal scar consistent with a panniculectomy no palpable hernias masses or fluid collections from the right inguinal ligament or around the scar suprapubic region.  No lymphadenopathy in the right inguinal region There is no rebound and no guarding.   Musculoskeletal:         General: No edema.     Neurological: She is alert. She has normal strength.   Skin: Skin is warm and dry.   Psychiatric: She has a normal mood and affect.         ED Course   Procedures  Labs Reviewed   COMPREHENSIVE METABOLIC  PANEL - Abnormal       Result Value    Sodium 139      Potassium 3.1 (*)     Chloride 102      CO2 25      Glucose 128 (*)     Blood Urea Nitrogen 14.8      Creatinine 0.83      Calcium 10.9 (*)     Protein Total 8.2 (*)     Albumin 4.3      Globulin 3.9 (*)     Albumin/Globulin Ratio 1.1      Bilirubin Total 0.7            ALT 21      AST 18      eGFR >60      Anion Gap 12.0      BUN/Creatinine Ratio 18     URINALYSIS, REFLEX TO URINE CULTURE - Abnormal    Color, UA Colorless      Appearance, UA Clear      Specific Gravity, UA 1.013      pH, UA 5.5      Protein, UA Negative      Glucose, UA Normal      Ketones, UA Negative      Blood, UA Trace (*)     Bilirubin, UA Negative      Urobilinogen, UA Normal      Nitrites, UA Negative      Leukocyte Esterase, UA Negative      RBC, UA 0-5      WBC, UA 0-5      Bacteria, UA Trace      Squamous Epithelial Cells, UA Trace     CBC W/ AUTO DIFFERENTIAL    Narrative:     The following orders were created for panel order CBC W/ AUTO DIFFERENTIAL.  Procedure                               Abnormality         Status                     ---------                               -----------         ------                     CBC with Differential[[id number]]                           Final result                 Please view results for these tests on the individual orders.   CBC WITH DIFFERENTIAL    WBC 7.67      RBC 4.41      Hgb 13.0      Hct 38.8      MCV 88.0      MCH 29.5      MCHC 33.5      RDW 12.5      Platelet 322      MPV 9.6      Neut % 56.0      Lymph % 35.7      Mono % 6.9      Eos % 0.8      Basophil % 0.3      Imm Grans % 0.3      Neut # 4.30      Lymph # 2.74      Mono # 0.53      Eos # 0.06      Baso # 0.02      Imm Gran # 0.02      NRBC% 0.0            Imaging Results    None          Medications - No data to display  Medical Decision Making  Differential diagnosis includes but is not limited to:  Inguinal hernia umbilical hernia scar tissue in traction  secondary to previous surgery lymphadenopathy from infection of the lower extremity or vaginal area.  Patient denies any pain soreness swelling of the vaginal area or in the right lower extremity and denies any rashes.  I discussed the way she has a scribe his symptoms that when it her has been 1st 2 weeks ago she felt like there was a swollen not in the right inguinal region that felt painful when she walked and felt like it pulled toward the umbilical region.  I explained this may have been from a hernia but there does not appear to be hernia on exam she states she is not feel it now in his not present now she has been get checked out because she has been to be in the emergency department.  I explained that if it comes back and feels severe then we need to image her and she needs to come back to the emergency department right away                       Patient declined any pain medications states ibuprofen and Tylenol usually help                 Clinical Impression:  Final diagnoses:  [R10.31] Right groin pain (Primary)          ED Disposition Condition    Discharge Stable          ED Prescriptions    None       Follow-up Information       Follow up With Specialties Details Why Contact Info    Nathaniel Marte MD Internal Medicine Schedule an appointment as soon as possible for a visit   [address] [place] [address]  [phone]                 [1]   Social History  Tobacco Use    Smoking status: Never    Smokeless tobacco: Never   Substance Use Topics    Alcohol use: Not Currently     Alcohol/week: 1.0 standard drink of alcohol     Types: 1 Glasses of wine per week     Comment: drink socially but haven't had a glass of wine since 2/13    Drug use: Never        Florentino Wakefield MD  05/05/25 [id number]     yes